# Patient Record
Sex: FEMALE | Race: OTHER | Employment: FULL TIME | URBAN - NONMETROPOLITAN AREA
[De-identification: names, ages, dates, MRNs, and addresses within clinical notes are randomized per-mention and may not be internally consistent; named-entity substitution may affect disease eponyms.]

---

## 2021-08-14 ENCOUNTER — HOSPITAL ENCOUNTER (EMERGENCY)
Age: 66
Discharge: HOME | End: 2021-08-15
Attending: EMERGENCY MEDICINE
Payer: COMMERCIAL

## 2021-08-14 ENCOUNTER — APPOINTMENT (OUTPATIENT)
Dept: GENERAL RADIOLOGY | Age: 66
End: 2021-08-14
Payer: COMMERCIAL

## 2021-08-14 DIAGNOSIS — S93.601A SPRAIN OF RIGHT FOOT, INITIAL ENCOUNTER: Primary | ICD-10-CM

## 2021-08-14 PROCEDURE — 99283 EMERGENCY DEPT VISIT LOW MDM: CPT

## 2021-08-14 PROCEDURE — 73630 X-RAY EXAM OF FOOT: CPT

## 2021-08-14 RX ORDER — ALPRAZOLAM 0.25 MG/1
0.25 TABLET ORAL NIGHTLY PRN
COMMUNITY

## 2021-08-14 RX ORDER — METOPROLOL SUCCINATE 25 MG/1
25 TABLET, EXTENDED RELEASE ORAL 2 TIMES DAILY
COMMUNITY

## 2021-08-14 ASSESSMENT — PAIN SCALES - GENERAL: PAINLEVEL_OUTOF10: 5

## 2021-08-15 VITALS
RESPIRATION RATE: 18 BRPM | DIASTOLIC BLOOD PRESSURE: 62 MMHG | SYSTOLIC BLOOD PRESSURE: 124 MMHG | OXYGEN SATURATION: 99 % | HEART RATE: 60 BPM | WEIGHT: 146 LBS | HEIGHT: 60 IN | TEMPERATURE: 98.3 F | BODY MASS INDEX: 28.66 KG/M2

## 2021-08-15 ASSESSMENT — ENCOUNTER SYMPTOMS: BACK PAIN: 0

## 2021-08-15 NOTE — ED NOTES
Discharged home in stable condition. AVS discussed/reviewed with patient. Patient verbalized understanding of all instructions given; no questions/concerns voiced. Respirations easy, regular and non-labored; no distress noted. Skin color normal for ethnicity, warm and dry; mucous membranes moist. Alert and appropriate for age. Ambulated to private vehicle.       Rashaad Kaiser RN  08/15/21 0028

## 2021-08-15 NOTE — ED TRIAGE NOTES
Patient presents per ambulation with c/o right foot pain. States on 8/3/21 she stumbled and rolled her foot. States she was seen and x-rayed after it happen. States she was told it was a sprain, but states it is still very swollen and painful, so she would like it x-rayed again. Right foot noted to be swollen to the outer aspect. Strong right pedal pulse palpated. Respirations easy, regular and non-labored; no distress noted. Skin color normal for ethnicity, warm and dry; mucous membranes moist. Alert and appropriate for age. Ambulated to exam room 6 for triage. Sitting in chair. Call light in reach. Dr. Meño Wallace aware of patient.

## 2021-08-15 NOTE — ED PROVIDER NOTES
[facility]  eMERGENCY dEPARTMENT eNCOUnter             Terri Abdi 17 COMPLAINT    Chief Complaint   Patient presents with    Foot Pain     right       Nurses Notes reviewed and I agree except as noted in the HPI. HPI    Rafael Salomon is a 74 y.o. female who presents for evaluation of injury to the right foot which occurred two weeks ago. She tripped and hyperflexed her foot, with immediate severe pain in the dorsum of the foot and lateral aspect of the foot. She got X-rays done elsewhere that were read as normal. Her pain, swelling, and bruising persist, increased with movement and weight bearing, 5/10. She is wrapping the foot and taking OTC pain medication. REVIEW OF SYSTEMS      Review of Systems   Musculoskeletal: Negative for back pain and neck pain. Neurological: Negative for sensory change. All other systems reviewed and are negative. PAST MEDICAL HISTORY     has a past medical history of Endometriosis and Hypertension. SURGICAL HISTORY     has a past surgical history that includes Cholecystectomy; Appendectomy; Hysterectomy; and pelvic laparoscopy. CURRENT MEDICATIONS    Previous Medications    ALPRAZOLAM (XANAX) 0.25 MG TABLET    Take 0.25 mg by mouth nightly as needed for Sleep. METOPROLOL SUCCINATE (TOPROL XL) 25 MG EXTENDED RELEASE TABLET    Take 25 mg by mouth 2 times daily        ALLERGIES    is allergic to aspirin and penicillins. FAMILY HISTORY    has no family status information on file. family history is not on file. SOCIAL HISTORY     reports that she has never smoked. She has never used smokeless tobacco. She reports previous alcohol use. She reports that she does not use drugs. PHYSICAL EXAM       INITIAL VITALS: /62   Pulse 60   Temp 98.3 °F (36.8 °C) (Oral)   Resp 18   Ht 5' (1.524 m)   Wt 146 lb (66.2 kg)   SpO2 99%   BMI 28.51 kg/m²      Physical Exam  Vitals and nursing note reviewed. Constitutional:       General: She is not in acute distress. Cardiovascular:      Pulses: Normal pulses. Musculoskeletal:      Comments: Right foot swelling, tenderness, fading bruises laterally, most tender over the base of the 5th metatarsal. Ankle nontender   Skin:     General: Skin is warm and dry. Capillary Refill: Capillary refill takes less than 2 seconds. Neurological:      General: No focal deficit present. Mental Status: She is alert and oriented to person, place, and time. Psychiatric:         Behavior: Behavior normal.           RADIOLOGY:    XR FOOT RIGHT (MIN 3 VIEWS)   Final Result   1. No acute disease. This document has been electronically signed by: David Cool M.D. on    08/14/2021 11:48 PM            Vitals:    Vitals:    08/14/21 2248 08/15/21 0006   BP: 127/69 124/62   Pulse: 61 60   Resp: 18 18   Temp: 98.3 °F (36.8 °C)    TempSrc: Oral    SpO2: 98% 99%   Weight: 146 lb (66.2 kg)    Height: 5' (1.524 m)        EMERGENCY DEPARTMENT COURSE:    X-ray results and plan of care discussed. Ace wrap and post-op shoe applied, normal neurovascular status before and after placement. Cane walking discussed. FINAL IMPRESSION      1.  Sprain of right foot, initial encounter        DISPOSITION/PLAN    DISPOSITION Decision To Discharge 08/14/2021 11:56:04 PM      PATIENT REFERRED TO:    Sal Guerrero MD  [address] [address]  [phone]      As needed      DISCHARGE MEDICATIONS:    New Prescriptions    No medications on file          (Please note that portions of this note were completed with a voice recognition program.  Efforts were made to edit the dictations but occasionally words are mis-transcribed.)      Garo Santiago MD  08/15/21 0631

## 2021-08-15 NOTE — ED NOTES
Left foot wrapped with four inch ace wrap then secured with tape. Post-op shoe then applied to left foot. +CSM pre and post ace and post-op shoe application.       Sarah Giordano RN  08/15/21 0002